# Patient Record
Sex: FEMALE | Race: WHITE | NOT HISPANIC OR LATINO
[De-identification: names, ages, dates, MRNs, and addresses within clinical notes are randomized per-mention and may not be internally consistent; named-entity substitution may affect disease eponyms.]

---

## 2018-02-06 PROBLEM — Z00.00 ENCOUNTER FOR PREVENTIVE HEALTH EXAMINATION: Status: ACTIVE | Noted: 2018-02-06

## 2018-02-21 ENCOUNTER — RECORD ABSTRACTING (OUTPATIENT)
Age: 68
End: 2018-02-21

## 2018-02-21 DIAGNOSIS — Z81.8 FAMILY HISTORY OF OTHER MENTAL AND BEHAVIORAL DISORDERS: ICD-10-CM

## 2018-02-21 DIAGNOSIS — Z83.79 FAMILY HISTORY OF OTHER DISEASES OF THE DIGESTIVE SYSTEM: ICD-10-CM

## 2018-02-21 DIAGNOSIS — Z78.0 ASYMPTOMATIC MENOPAUSAL STATE: ICD-10-CM

## 2018-02-21 DIAGNOSIS — Z92.89 PERSONAL HISTORY OF OTHER MEDICAL TREATMENT: ICD-10-CM

## 2018-02-21 RX ORDER — HYDROCHLOROTHIAZIDE 25 MG/1
25 TABLET ORAL
Refills: 0 | Status: ACTIVE | COMMUNITY

## 2018-02-21 RX ORDER — ZOLPIDEM TARTRATE 10 MG/1
10 TABLET ORAL
Refills: 0 | Status: ACTIVE | COMMUNITY

## 2018-02-27 ENCOUNTER — LABORATORY RESULT (OUTPATIENT)
Age: 68
End: 2018-02-27

## 2018-02-28 ENCOUNTER — APPOINTMENT (OUTPATIENT)
Dept: OBGYN | Facility: CLINIC | Age: 68
End: 2018-02-28
Payer: COMMERCIAL

## 2018-02-28 VITALS — HEIGHT: 64 IN | BODY MASS INDEX: 23.56 KG/M2 | WEIGHT: 138 LBS

## 2018-02-28 LAB
BILIRUB UR QL STRIP: NORMAL
GLUCOSE UR-MCNC: NORMAL
HCG UR QL: NORMAL EU/DL
HGB UR QL STRIP.AUTO: NORMAL
KETONES UR-MCNC: NORMAL
LEUKOCYTE ESTERASE UR QL STRIP: 500
NITRITE UR QL STRIP: NORMAL
PH UR STRIP: 5
PROT UR STRIP-MCNC: NORMAL
SP GR UR STRIP: 1.01

## 2018-02-28 PROCEDURE — 99397 PER PM REEVAL EST PAT 65+ YR: CPT

## 2018-02-28 PROCEDURE — 90686 IIV4 VACC NO PRSV 0.5 ML IM: CPT

## 2018-02-28 PROCEDURE — 81003 URINALYSIS AUTO W/O SCOPE: CPT | Mod: QW

## 2018-02-28 PROCEDURE — 90471 IMMUNIZATION ADMIN: CPT | Mod: 59

## 2018-03-14 ENCOUNTER — TRANSCRIPTION ENCOUNTER (OUTPATIENT)
Age: 68
End: 2018-03-14

## 2018-03-15 ENCOUNTER — APPOINTMENT (OUTPATIENT)
Dept: OBGYN | Facility: CLINIC | Age: 68
End: 2018-03-15
Payer: COMMERCIAL

## 2018-03-15 ENCOUNTER — RX RENEWAL (OUTPATIENT)
Age: 68
End: 2018-03-15

## 2018-03-15 DIAGNOSIS — Z87.09 PERSONAL HISTORY OF OTHER DISEASES OF THE RESPIRATORY SYSTEM: ICD-10-CM

## 2018-03-15 PROCEDURE — 76830 TRANSVAGINAL US NON-OB: CPT

## 2018-03-15 PROCEDURE — 99213 OFFICE O/P EST LOW 20 MIN: CPT

## 2018-11-27 ENCOUNTER — APPOINTMENT (OUTPATIENT)
Dept: OBGYN | Facility: CLINIC | Age: 68
End: 2018-11-27
Payer: COMMERCIAL

## 2018-11-27 ENCOUNTER — OUTPATIENT (OUTPATIENT)
Dept: OUTPATIENT SERVICES | Facility: HOSPITAL | Age: 68
LOS: 1 days | Discharge: HOME | End: 2018-11-27

## 2018-11-27 VITALS — BODY MASS INDEX: 23.05 KG/M2 | WEIGHT: 135 LBS | HEIGHT: 64 IN

## 2018-11-27 LAB
BILIRUB UR QL STRIP: NORMAL
GLUCOSE UR-MCNC: 100
HCG UR QL: NORMAL EU/DL
HGB UR QL STRIP.AUTO: NORMAL
KETONES UR-MCNC: 15
LEUKOCYTE ESTERASE UR QL STRIP: 500
NITRITE UR QL STRIP: NORMAL
PH UR STRIP: 5
PROT UR STRIP-MCNC: 500
SP GR UR STRIP: 1.02

## 2018-11-27 PROCEDURE — 81003 URINALYSIS AUTO W/O SCOPE: CPT | Mod: QW

## 2018-11-27 PROCEDURE — 96372 THER/PROPH/DIAG INJ SC/IM: CPT

## 2018-11-27 PROCEDURE — 99214 OFFICE O/P EST MOD 30 MIN: CPT | Mod: 25

## 2018-11-29 DIAGNOSIS — Z00.00 ENCOUNTER FOR GENERAL ADULT MEDICAL EXAMINATION WITHOUT ABNORMAL FINDINGS: ICD-10-CM

## 2018-12-01 LAB — HPV HIGH+LOW RISK DNA PNL CVX: NOT DETECTED

## 2019-03-07 ENCOUNTER — APPOINTMENT (OUTPATIENT)
Dept: OBGYN | Facility: CLINIC | Age: 69
End: 2019-03-07
Payer: COMMERCIAL

## 2019-03-07 PROCEDURE — 77085 DXA BONE DENSITY AXL VRT FX: CPT

## 2019-03-14 ENCOUNTER — RX RENEWAL (OUTPATIENT)
Age: 69
End: 2019-03-14

## 2019-03-14 RX ORDER — DENOSUMAB 60 MG/ML
60 INJECTION SUBCUTANEOUS
Qty: 1 | Refills: 1 | Status: ACTIVE | COMMUNITY
Start: 2019-03-14 | End: 1900-01-01

## 2019-06-25 ENCOUNTER — APPOINTMENT (OUTPATIENT)
Dept: OBGYN | Facility: CLINIC | Age: 69
End: 2019-06-25
Payer: COMMERCIAL

## 2019-06-25 VITALS — WEIGHT: 135 LBS | BODY MASS INDEX: 23.17 KG/M2

## 2019-06-25 PROCEDURE — 96372 THER/PROPH/DIAG INJ SC/IM: CPT

## 2019-12-19 ENCOUNTER — OUTPATIENT (OUTPATIENT)
Dept: OUTPATIENT SERVICES | Facility: HOSPITAL | Age: 69
LOS: 1 days | Discharge: HOME | End: 2019-12-19

## 2019-12-19 ENCOUNTER — APPOINTMENT (OUTPATIENT)
Dept: OBGYN | Facility: CLINIC | Age: 69
End: 2019-12-19
Payer: COMMERCIAL

## 2019-12-19 VITALS — HEIGHT: 64 IN | WEIGHT: 125 LBS | BODY MASS INDEX: 21.34 KG/M2

## 2019-12-19 DIAGNOSIS — M81.0 AGE-RELATED OSTEOPOROSIS W/OUT CURRENT PATHOLOGICAL FRACTURE: ICD-10-CM

## 2019-12-19 PROCEDURE — 96372 THER/PROPH/DIAG INJ SC/IM: CPT

## 2019-12-19 PROCEDURE — 99397 PER PM REEVAL EST PAT 65+ YR: CPT

## 2019-12-19 PROCEDURE — 81003 URINALYSIS AUTO W/O SCOPE: CPT | Mod: NC,QW

## 2019-12-20 DIAGNOSIS — Z01.411 ENCOUNTER FOR GYNECOLOGICAL EXAMINATION (GENERAL) (ROUTINE) WITH ABNORMAL FINDINGS: ICD-10-CM

## 2019-12-21 LAB
BILIRUB UR QL STRIP: NORMAL
CLARITY UR: CLEAR
GLUCOSE UR-MCNC: NORMAL
HCG UR QL: NORMAL EU/DL
HGB UR QL STRIP.AUTO: NORMAL
KETONES UR-MCNC: NORMAL
LEUKOCYTE ESTERASE UR QL STRIP: 75
NITRITE UR QL STRIP: NORMAL
PH UR STRIP: 5
PROT UR STRIP-MCNC: NORMAL
SP GR UR STRIP: 1.02

## 2020-12-16 PROBLEM — Z87.09 HISTORY OF ACUTE PHARYNGITIS: Status: RESOLVED | Noted: 2018-03-15 | Resolved: 2020-12-16

## 2021-11-18 ENCOUNTER — APPOINTMENT (OUTPATIENT)
Dept: OBGYN | Facility: CLINIC | Age: 71
End: 2021-11-18
Payer: COMMERCIAL

## 2021-11-18 ENCOUNTER — NON-APPOINTMENT (OUTPATIENT)
Age: 71
End: 2021-11-18

## 2021-11-18 VITALS — WEIGHT: 125 LBS | HEIGHT: 63 IN | TEMPERATURE: 98 F | BODY MASS INDEX: 22.15 KG/M2

## 2021-11-18 PROCEDURE — 99397 PER PM REEVAL EST PAT 65+ YR: CPT

## 2021-11-18 PROCEDURE — 81003 URINALYSIS AUTO W/O SCOPE: CPT | Mod: QW

## 2021-11-19 LAB
BILIRUB UR QL STRIP: NORMAL
GLUCOSE UR-MCNC: NORMAL
HCG UR QL: 0.2 EU/DL
HGB UR QL STRIP.AUTO: NORMAL
KETONES UR-MCNC: NORMAL
LEUKOCYTE ESTERASE UR QL STRIP: NORMAL
NITRITE UR QL STRIP: NORMAL
PH UR STRIP: 5.5
PROT UR STRIP-MCNC: NORMAL
SP GR UR STRIP: >-1.03

## 2021-12-09 LAB — CYTOLOGY CVX/VAG DOC THIN PREP: ABNORMAL

## 2022-07-21 ENCOUNTER — APPOINTMENT (OUTPATIENT)
Dept: CARDIOLOGY | Facility: CLINIC | Age: 72
End: 2022-07-21

## 2022-08-02 ENCOUNTER — APPOINTMENT (OUTPATIENT)
Dept: CARDIOLOGY | Facility: CLINIC | Age: 72
End: 2022-08-02

## 2022-08-02 VITALS
BODY MASS INDEX: 22.15 KG/M2 | TEMPERATURE: 98.7 F | WEIGHT: 125 LBS | DIASTOLIC BLOOD PRESSURE: 60 MMHG | HEART RATE: 76 BPM | SYSTOLIC BLOOD PRESSURE: 110 MMHG | HEIGHT: 63 IN

## 2022-08-02 DIAGNOSIS — R06.02 SHORTNESS OF BREATH: ICD-10-CM

## 2022-08-02 PROCEDURE — 93000 ELECTROCARDIOGRAM COMPLETE: CPT

## 2022-08-02 PROCEDURE — 99204 OFFICE O/P NEW MOD 45 MIN: CPT | Mod: 25

## 2022-08-02 RX ORDER — AMOXICILLIN 500 MG/1
500 CAPSULE ORAL 3 TIMES DAILY
Qty: 30 | Refills: 0 | Status: DISCONTINUED | COMMUNITY
Start: 2018-03-15 | End: 2022-08-02

## 2022-08-02 RX ORDER — CARBIDOPA AND LEVODOPA 25; 100 MG/1; MG/1
25-100 TABLET ORAL
Qty: 90 | Refills: 0 | Status: ACTIVE | COMMUNITY
Start: 2022-07-05

## 2022-08-02 RX ORDER — FLUDROCORTISONE ACETATE 0.1 MG/1
0.1 TABLET ORAL
Qty: 30 | Refills: 0 | Status: ACTIVE | COMMUNITY
Start: 2022-07-05

## 2022-08-02 RX ORDER — RASAGILINE MESYLATE 1 MG/1
1 TABLET ORAL DAILY
Refills: 0 | Status: ACTIVE | COMMUNITY

## 2022-08-02 RX ORDER — PRIMIDONE 50 MG/1
50 TABLET ORAL
Qty: 60 | Refills: 0 | Status: ACTIVE | COMMUNITY
Start: 2022-07-05

## 2022-08-02 RX ORDER — AMANTADINE HYDROCHLORIDE 100 MG/1
100 CAPSULE ORAL
Refills: 0 | Status: DISCONTINUED | COMMUNITY
End: 2022-08-02

## 2022-08-02 RX ORDER — PROPRANOLOL HYDROCHLORIDE 40 MG/1
40 TABLET ORAL
Qty: 30 | Refills: 0 | Status: ACTIVE | COMMUNITY
Start: 2022-07-05

## 2022-08-02 NOTE — REVIEW OF SYSTEMS
[Dyspnea on exertion] : dyspnea during exertion [Chest Discomfort] : chest discomfort [Lower Ext Edema] : lower extremity edema

## 2022-08-02 NOTE — DISCUSSION/SUMMARY
[FreeTextEntry1] : IV adenosine thallium stress test to exclude myocardial ischemia \par Maintain present cardiac medications.\par Patient was instructed to target her T. Cholesterol to less than 200 mg/dl and LDL cholesterol to less than 100 mg/dl.\par Maintain cardiac medications. \par Patient was advised on her BMP, CBC , fasting lipid profile and hepatic panel.\par Patient was also advised on the findings of her TTE and EKG's \par RV in 2 weeks after her adenosine thallium stress test.\par

## 2022-08-02 NOTE — ASSESSMENT
[FreeTextEntry1] : PVC's\par Mild to moderate TR\par Mild PI\par Exertional dyspnea\par Atypical chest pain\par Parkinson's disease\par Atherosclerosis of thoracic aorta on CXR with segmental calcifications of thoracic aorta reported\par R/O ASHD

## 2022-08-02 NOTE — PHYSICAL EXAM
[Well Developed] : well developed [Well Nourished] : well nourished [No Acute Distress] : no acute distress [Normal Conjunctiva] : normal conjunctiva [No Carotid Bruit] : no carotid bruit [Normal S1, S2] : normal S1, S2 [No Rub] : no rub [No Gallop] : no gallop [Murmur] : murmur [Soft] : abdomen soft [Non Tender] : non-tender [Venous varicosities] : venous varicosities [No Rash] : no rash [No Skin Lesions] : no skin lesions [Moves all extremities] : moves all extremities [Normal Speech] : normal speech [Alert and Oriented] : alert and oriented [Normal memory] : normal memory [de-identified] : Mild distension of Jugular veins c/w TR [de-identified] : Grade I/VI systolic murmur at RSB [de-identified] : Patient has Parkinson's disease and  [de-identified] : trace LE edema bilaterallt [de-identified] : Tremor

## 2022-08-02 NOTE — HISTORY OF PRESENT ILLNESS
[FreeTextEntry1] : Parkinson's disease\par TR mild to moderate\par Mild PI\par RICH\par Hypertension\par Tremor since she was in her 30's\par Patient denies a history of MI, angina, CHF, arrhythmia, TIA, CA, syncope, Hyperlipidemia, obesity or familial history of heart disease.\par PSurgHx: Tubal ligation 40 years ago

## 2022-08-02 NOTE — REASON FOR VISIT
[FreeTextEntry1] : Pleasant 71 year old WF presents for a second opinion Cardiology consultation due to abnormal findings on her TTE and having ectopy on her 12 lead EKG. She has also noted shortness of breath with exertion and was diagnosed with mild to moderate TR and Right atrial enlargement. She had not received an explanation of her TTE findings to her satisfaction and is here to determine the cause of her exertional dyspnea.

## 2022-08-22 ENCOUNTER — LABORATORY RESULT (OUTPATIENT)
Age: 72
End: 2022-08-22

## 2022-08-25 ENCOUNTER — OUTPATIENT (OUTPATIENT)
Dept: OUTPATIENT SERVICES | Facility: HOSPITAL | Age: 72
LOS: 1 days | Discharge: HOME | End: 2022-08-25

## 2022-08-25 ENCOUNTER — RESULT REVIEW (OUTPATIENT)
Age: 72
End: 2022-08-25

## 2022-08-25 DIAGNOSIS — R07.9 CHEST PAIN, UNSPECIFIED: ICD-10-CM

## 2022-08-25 PROCEDURE — 93018 CV STRESS TEST I&R ONLY: CPT

## 2022-08-25 PROCEDURE — 78452 HT MUSCLE IMAGE SPECT MULT: CPT | Mod: 26,ME

## 2022-08-25 PROCEDURE — G1004: CPT

## 2022-08-25 PROCEDURE — 93016 CV STRESS TEST SUPVJ ONLY: CPT

## 2022-08-25 RX ORDER — ADENOSINE 3 MG/ML
60 INJECTION INTRAVENOUS ONCE
Refills: 0 | Status: DISCONTINUED | OUTPATIENT
Start: 2022-08-25 | End: 2022-09-08

## 2022-09-15 ENCOUNTER — APPOINTMENT (OUTPATIENT)
Dept: CARDIOLOGY | Facility: CLINIC | Age: 72
End: 2022-09-15

## 2022-09-15 VITALS
DIASTOLIC BLOOD PRESSURE: 78 MMHG | TEMPERATURE: 98.7 F | WEIGHT: 125 LBS | SYSTOLIC BLOOD PRESSURE: 130 MMHG | BODY MASS INDEX: 22.15 KG/M2 | HEIGHT: 63 IN

## 2022-09-15 PROCEDURE — 99214 OFFICE O/P EST MOD 30 MIN: CPT

## 2022-09-15 NOTE — REASON FOR VISIT
[FreeTextEntry1] : Pleasant 71 year old WF presents for a second opinion Cardiology consultation due to abnormal findings on her TTE and having ectopy on her 12 lead EKG. She has also noted shortness of breath with exertion and was diagnosed with mild to moderate TR and Right atrial enlargement. She is here to review the results of her adenosine nuclear stress thallium.

## 2022-09-15 NOTE — PHYSICAL EXAM
[Well Developed] : well developed [Well Nourished] : well nourished [No Acute Distress] : no acute distress [Normal Conjunctiva] : normal conjunctiva [No Carotid Bruit] : no carotid bruit [Normal S1, S2] : normal S1, S2 [No Rub] : no rub [No Gallop] : no gallop [Murmur] : murmur [Soft] : abdomen soft [Non Tender] : non-tender [Venous varicosities] : venous varicosities [No Rash] : no rash [No Skin Lesions] : no skin lesions [Moves all extremities] : moves all extremities [Normal Speech] : normal speech [Alert and Oriented] : alert and oriented [Normal memory] : normal memory [de-identified] : Mild distension of Jugular veins c/w TR [de-identified] : Grade I/VI systolic murmur at RSB [de-identified] : Patient has Parkinson's disease and  [de-identified] : trace LE edema bilaterallt [de-identified] : Tremor

## 2022-09-15 NOTE — ASSESSMENT
[FreeTextEntry1] : PVC's\par Mild to moderate TR\par Mild PI\par Negative adenosine thallium stress test\par Parkinson's disease\par Atherosclerosis of thoracic aorta on CXR with segmental calcifications of thoracic aorta reported\par R/O ASHD

## 2022-09-15 NOTE — DISCUSSION/SUMMARY
[FreeTextEntry1] : IV adenosine thallium stress test to exclude myocardial ischemia revealed normal findings.\par Maintain present cardiac medications.\par Patient was instructed to target her T. Cholesterol to less than 200 mg/dl and LDL cholesterol to less than 100 mg/dl.\par Maintain cardiac medications. \par Patient was advised on her BMP, CBC , fasting lipid profile and hepatic panel.\par Patient was also advised on the findings of her TTE and EKG's \par RV in 1 year\par

## 2022-10-04 ENCOUNTER — EMERGENCY (EMERGENCY)
Facility: HOSPITAL | Age: 72
LOS: 0 days | Discharge: HOME | End: 2022-10-04
Attending: EMERGENCY MEDICINE | Admitting: EMERGENCY MEDICINE
Payer: COMMERCIAL

## 2022-10-04 VITALS
RESPIRATION RATE: 17 BRPM | OXYGEN SATURATION: 99 % | DIASTOLIC BLOOD PRESSURE: 67 MMHG | SYSTOLIC BLOOD PRESSURE: 135 MMHG | TEMPERATURE: 100 F | HEART RATE: 87 BPM

## 2022-10-04 VITALS
HEART RATE: 123 BPM | DIASTOLIC BLOOD PRESSURE: 66 MMHG | RESPIRATION RATE: 17 BRPM | SYSTOLIC BLOOD PRESSURE: 166 MMHG | TEMPERATURE: 103 F | WEIGHT: 130.07 LBS | OXYGEN SATURATION: 99 %

## 2022-10-04 DIAGNOSIS — G20 PARKINSON'S DISEASE: ICD-10-CM

## 2022-10-04 DIAGNOSIS — R10.32 LEFT LOWER QUADRANT PAIN: ICD-10-CM

## 2022-10-04 DIAGNOSIS — R10.9 UNSPECIFIED ABDOMINAL PAIN: ICD-10-CM

## 2022-10-04 DIAGNOSIS — R10.13 EPIGASTRIC PAIN: ICD-10-CM

## 2022-10-04 DIAGNOSIS — R52 PAIN, UNSPECIFIED: ICD-10-CM

## 2022-10-04 DIAGNOSIS — U07.1 COVID-19: ICD-10-CM

## 2022-10-04 DIAGNOSIS — R51.9 HEADACHE, UNSPECIFIED: ICD-10-CM

## 2022-10-04 LAB
ALBUMIN SERPL ELPH-MCNC: 4 G/DL — SIGNIFICANT CHANGE UP (ref 3.5–5.2)
ALP SERPL-CCNC: 124 U/L — HIGH (ref 30–115)
ALT FLD-CCNC: 15 U/L — SIGNIFICANT CHANGE UP (ref 0–41)
ANION GAP SERPL CALC-SCNC: 10 MMOL/L — SIGNIFICANT CHANGE UP (ref 7–14)
APPEARANCE UR: CLEAR — SIGNIFICANT CHANGE UP
APTT BLD: 31.8 SEC — SIGNIFICANT CHANGE UP (ref 27–39.2)
AST SERPL-CCNC: 34 U/L — SIGNIFICANT CHANGE UP (ref 0–41)
BACTERIA # UR AUTO: ABNORMAL
BASOPHILS # BLD AUTO: 0.02 K/UL — SIGNIFICANT CHANGE UP (ref 0–0.2)
BASOPHILS NFR BLD AUTO: 0.3 % — SIGNIFICANT CHANGE UP (ref 0–1)
BILIRUB SERPL-MCNC: 0.6 MG/DL — SIGNIFICANT CHANGE UP (ref 0.2–1.2)
BILIRUB UR-MCNC: NEGATIVE — SIGNIFICANT CHANGE UP
BUN SERPL-MCNC: 26 MG/DL — HIGH (ref 10–20)
CALCIUM SERPL-MCNC: 9.1 MG/DL — SIGNIFICANT CHANGE UP (ref 8.4–10.5)
CHLORIDE SERPL-SCNC: 100 MMOL/L — SIGNIFICANT CHANGE UP (ref 98–110)
CO2 SERPL-SCNC: 27 MMOL/L — SIGNIFICANT CHANGE UP (ref 17–32)
COLOR SPEC: YELLOW — SIGNIFICANT CHANGE UP
COMMENT - URINE: SIGNIFICANT CHANGE UP
CREAT SERPL-MCNC: 0.7 MG/DL — SIGNIFICANT CHANGE UP (ref 0.7–1.5)
DIFF PNL FLD: NEGATIVE — SIGNIFICANT CHANGE UP
EGFR: 91 ML/MIN/1.73M2 — SIGNIFICANT CHANGE UP
EOSINOPHIL # BLD AUTO: 0 K/UL — SIGNIFICANT CHANGE UP (ref 0–0.7)
EOSINOPHIL NFR BLD AUTO: 0 % — SIGNIFICANT CHANGE UP (ref 0–8)
EPI CELLS # UR: ABNORMAL /HPF
GLUCOSE SERPL-MCNC: 129 MG/DL — HIGH (ref 70–99)
GLUCOSE UR QL: NEGATIVE MG/DL — SIGNIFICANT CHANGE UP
HCT VFR BLD CALC: 37.2 % — SIGNIFICANT CHANGE UP (ref 37–47)
HGB BLD-MCNC: 12.6 G/DL — SIGNIFICANT CHANGE UP (ref 12–16)
IMM GRANULOCYTES NFR BLD AUTO: 0.4 % — HIGH (ref 0.1–0.3)
INR BLD: 1.38 RATIO — HIGH (ref 0.65–1.3)
KETONES UR-MCNC: ABNORMAL
LACTATE SERPL-SCNC: 1.4 MMOL/L — SIGNIFICANT CHANGE UP (ref 0.7–2)
LEUKOCYTE ESTERASE UR-ACNC: NEGATIVE — SIGNIFICANT CHANGE UP
LYMPHOCYTES # BLD AUTO: 0.42 K/UL — LOW (ref 1.2–3.4)
LYMPHOCYTES # BLD AUTO: 5.9 % — LOW (ref 20.5–51.1)
MCHC RBC-ENTMCNC: 29.3 PG — SIGNIFICANT CHANGE UP (ref 27–31)
MCHC RBC-ENTMCNC: 33.9 G/DL — SIGNIFICANT CHANGE UP (ref 32–37)
MCV RBC AUTO: 86.5 FL — SIGNIFICANT CHANGE UP (ref 81–99)
MONOCYTES # BLD AUTO: 0.23 K/UL — SIGNIFICANT CHANGE UP (ref 0.1–0.6)
MONOCYTES NFR BLD AUTO: 3.2 % — SIGNIFICANT CHANGE UP (ref 1.7–9.3)
NEUTROPHILS # BLD AUTO: 6.38 K/UL — SIGNIFICANT CHANGE UP (ref 1.4–6.5)
NEUTROPHILS NFR BLD AUTO: 90.2 % — HIGH (ref 42.2–75.2)
NITRITE UR-MCNC: NEGATIVE — SIGNIFICANT CHANGE UP
NRBC # BLD: 0 /100 WBCS — SIGNIFICANT CHANGE UP (ref 0–0)
PH UR: 6.5 — SIGNIFICANT CHANGE UP (ref 5–8)
PLATELET # BLD AUTO: 171 K/UL — SIGNIFICANT CHANGE UP (ref 130–400)
POTASSIUM SERPL-MCNC: 4.2 MMOL/L — SIGNIFICANT CHANGE UP (ref 3.5–5)
POTASSIUM SERPL-SCNC: 4.2 MMOL/L — SIGNIFICANT CHANGE UP (ref 3.5–5)
PROT SERPL-MCNC: 6.7 G/DL — SIGNIFICANT CHANGE UP (ref 6–8)
PROT UR-MCNC: 100 MG/DL
PROTHROM AB SERPL-ACNC: 15.9 SEC — HIGH (ref 9.95–12.87)
RBC # BLD: 4.3 M/UL — SIGNIFICANT CHANGE UP (ref 4.2–5.4)
RBC # FLD: 14.5 % — SIGNIFICANT CHANGE UP (ref 11.5–14.5)
RBC CASTS # UR COMP ASSIST: SIGNIFICANT CHANGE UP /HPF
SODIUM SERPL-SCNC: 137 MMOL/L — SIGNIFICANT CHANGE UP (ref 135–146)
SP GR SPEC: 1.02 — SIGNIFICANT CHANGE UP (ref 1.01–1.03)
TROPONIN T SERPL-MCNC: <0.01 NG/ML — SIGNIFICANT CHANGE UP
UROBILINOGEN FLD QL: 1 MG/DL
WBC # BLD: 7.08 K/UL — SIGNIFICANT CHANGE UP (ref 4.8–10.8)
WBC # FLD AUTO: 7.08 K/UL — SIGNIFICANT CHANGE UP (ref 4.8–10.8)
WBC UR QL: SIGNIFICANT CHANGE UP /HPF

## 2022-10-04 PROCEDURE — 71045 X-RAY EXAM CHEST 1 VIEW: CPT | Mod: 26

## 2022-10-04 PROCEDURE — 74177 CT ABD & PELVIS W/CONTRAST: CPT | Mod: 26,MA

## 2022-10-04 PROCEDURE — 99285 EMERGENCY DEPT VISIT HI MDM: CPT

## 2022-10-04 PROCEDURE — 93010 ELECTROCARDIOGRAM REPORT: CPT

## 2022-10-04 PROCEDURE — 70450 CT HEAD/BRAIN W/O DYE: CPT | Mod: 26,MA,59

## 2022-10-04 RX ORDER — METOCLOPRAMIDE HCL 10 MG
10 TABLET ORAL ONCE
Refills: 0 | Status: COMPLETED | OUTPATIENT
Start: 2022-10-04 | End: 2022-10-04

## 2022-10-04 RX ORDER — SODIUM CHLORIDE 9 MG/ML
1850 INJECTION, SOLUTION INTRAVENOUS ONCE
Refills: 0 | Status: COMPLETED | OUTPATIENT
Start: 2022-10-04 | End: 2022-10-04

## 2022-10-04 RX ORDER — ACETAMINOPHEN 500 MG
650 TABLET ORAL ONCE
Refills: 0 | Status: COMPLETED | OUTPATIENT
Start: 2022-10-04 | End: 2022-10-04

## 2022-10-04 RX ADMIN — Medication 650 MILLIGRAM(S): at 17:26

## 2022-10-04 RX ADMIN — Medication 104 MILLIGRAM(S): at 17:27

## 2022-10-04 RX ADMIN — SODIUM CHLORIDE 1850 MILLILITER(S): 9 INJECTION, SOLUTION INTRAVENOUS at 17:27

## 2022-10-04 NOTE — ED PROVIDER NOTE - OBJECTIVE STATEMENT
72y F pmh parkinson's presents for eval of headache. Pt has moderate aching frontal headache x2 days with associated body aches and abdominal pain. No aggravating or relieving factors. Pt was diagnosed with COVID x10 days ago.

## 2022-10-04 NOTE — ED PROVIDER NOTE - PATIENT PORTAL LINK FT
You can access the FollowMyHealth Patient Portal offered by [facility] by registering at the following website: [email or web address]. By joining [email or web address]’s FollowMyHealth portal, you will also be able to view your health information using other applications (apps) compatible with our system.

## 2022-10-04 NOTE — ED PROVIDER NOTE - NSFOLLOWUPINSTRUCTIONS_ED_ALL_ED_FT
Patient to be discharged from ED. Any available test results were discussed with patient and/or family. Verbal instructions given, including instructions to return to ED immediately for any new, worsening, or concerning symptoms. Patient endorsed understanding. Written discharge instructions additionally given, including follow-up plan.    Fever    A fever is an increase in the body's temperature above 100.4°F (38°C) or higher. In adults and children older than three months, a brief mild or moderate fever generally has no long-term effect, and it usually does not require treatment. Many times, fevers are the result of viral infections, which are self-resolving.  However, certain symptoms or diagnostic tests may suggest a bacterial infection that may respond to antibiotics. Take medications as directed by your health care provider.    SEEK IMMEDIATE MEDICAL CARE IF YOU OR YOUR CHILD HAVE ANY OF THE FOLLOWING SYMPTOMS : shortness of breath, seizure, rash/stiff neck/headache, severe abdominal pain, persistent vomiting, any signs of dehydration, or if your child has a fever for over five (5) days.

## 2022-10-04 NOTE — ED PROVIDER NOTE - NS ED ATTENDING STATEMENT MOD
This was a shared visit with the KENDY. I reviewed and verified the documentation and independently performed the documented:

## 2022-10-04 NOTE — ED PROVIDER NOTE - ATTENDING APP SHARED VISIT CONTRIBUTION OF CARE
72 y.o. F, PMH of Parkinson's dz, tested COVID (+) on home test 10 days ago, comes in c/o 3 day h/o HA, SOB, body aches, weakness. Getting worse. States HA resolved today but pt still feels weak. No leg pain or swelling. Pt vaccinated against COVID x3. On exam, pt in NAD, AAOx3, head NC/AT, CN II-XII intact, PEERL, EOMi, neck (-) midline tenderness, lungs CTA B/L, CV S1S2 regular, abdomen soft/(+) LLQ pain/ND/(+)BS, ext (-) edema, motor 5/5x4, sensation intact, parkinson's tremor noted. Will do labs, CT, XR, UA and reevaluate.

## 2022-10-04 NOTE — ED ADULT TRIAGE NOTE - CHIEF COMPLAINT QUOTE
had covid 2 weeks ago, today feels worse, rib pain, denies cough, headache, chills, bodyaches, does not own thermometer

## 2022-10-04 NOTE — ED PROVIDER NOTE - PHYSICAL EXAMINATION
CONST: Febrile, NAD  EYES: PERRL, EOMI, Sclera and conjunctiva clear.   ENT: No nasal discharge. Oropharynx normal appearing, no erythema or exudates. No abscess or swelling. Uvula midline.   NECK: Non-tender, no meningeal signs. normal ROM. supple   CARD: Tachycardiac S1 S2; No jvd  RESP: Equal BS B/L, No wheezes, rhonchi or rales. No distress  GI: Epigastric tenderness. Soft, non-distended. no cva tenderness. normal BS  MS: Normal ROM in all extremities. pulses 2 +. no calf tenderness or swelling  SKIN: Warm, dry, no acute rashes. Good turgor  NEURO: Parkinsonian tremor. A&Ox3, No focal deficits. Strength 5/5 with no sensory deficits. Finger to nose intact. Negative pronator drift

## 2022-10-04 NOTE — ED PROVIDER NOTE - CLINICAL SUMMARY MEDICAL DECISION MAKING FREE TEXT BOX
72 y.o. F, PMH of Parkinson's dz, tested COVID (+) on home test 10 days ago, comes in c/o 3 day h/o HA, SOB, body aches, weakness. Getting worse. States HA resolved today but pt still feels weak. No leg pain or swelling. Pt vaccinated against COVID x3. On exam, pt in NAD, AAOx3, head NC/AT, CN II-XII intact, PEERL, EOMi, neck (-) midline tenderness, lungs CTA B/L, CV S1S2 regular, abdomen soft/(+) LLQ pain/ND/(+)BS, ext (-) edema, motor 5/5x4, sensation intact, parkinson's tremor noted. Labs, CT, XR, UA done and reviewed. Will d/c. Return precautions given.

## 2022-10-04 NOTE — ED PROVIDER NOTE - NS ED ROS FT
Constitutional: (+) body aches, (-) fever  Eyes/ENT: (-) blurry vision, (-) epistaxis  Cardiovascular: (-) chest pain, (-) syncope  Respiratory: (-) cough, (-) shortness of breath  Gastrointestinal: (+) abd pain, (-) vomiting, (-) diarrhea  : (-) dysuria, (-) hematuria  Musculoskeletal: (-) neck pain, (-) back pain, (-) joint pain  Integumentary: (-) rash, (-) edema  Neurological: (+) headache, (-) altered mental status  Allergic/Immunologic: (-) pruritus

## 2022-10-06 LAB
CULTURE RESULTS: SIGNIFICANT CHANGE UP
SPECIMEN SOURCE: SIGNIFICANT CHANGE UP

## 2022-10-10 LAB
CULTURE RESULTS: SIGNIFICANT CHANGE UP
CULTURE RESULTS: SIGNIFICANT CHANGE UP
SPECIMEN SOURCE: SIGNIFICANT CHANGE UP
SPECIMEN SOURCE: SIGNIFICANT CHANGE UP

## 2022-11-15 ENCOUNTER — APPOINTMENT (OUTPATIENT)
Dept: CARDIOLOGY | Facility: CLINIC | Age: 72
End: 2022-11-15

## 2022-11-15 VITALS
HEIGHT: 63 IN | WEIGHT: 124 LBS | DIASTOLIC BLOOD PRESSURE: 82 MMHG | BODY MASS INDEX: 21.97 KG/M2 | HEART RATE: 67 BPM | SYSTOLIC BLOOD PRESSURE: 130 MMHG

## 2022-11-15 DIAGNOSIS — U07.1 COVID-19: ICD-10-CM

## 2022-11-15 PROBLEM — G20 PARKINSON'S DISEASE: Chronic | Status: ACTIVE | Noted: 2022-10-04

## 2022-11-15 PROCEDURE — 99214 OFFICE O/P EST MOD 30 MIN: CPT | Mod: 25

## 2022-11-15 PROCEDURE — 93000 ELECTROCARDIOGRAM COMPLETE: CPT

## 2022-11-15 NOTE — REASON FOR VISIT
[FreeTextEntry1] : Patient presents for follow up because she was diagnosed with COVID-19 on September 19,2022 and went to 2 separate Hospital ERs and an urgent care center and did not receive any therapy for her infection. She just wanted to have a repeat cardiac examination.

## 2022-11-15 NOTE — DISCUSSION/SUMMARY
[FreeTextEntry1] : IV adenosine thallium stress test revealed normal findings.\par Her prior TTE revealed Normal LV systolic function RICH,with PI and TR\par Maintain present cardiac medications.\par Patient was instructed to target her T. Cholesterol to less than 200 mg/dl and LDL cholesterol to less than 100 mg/dl.\par Maintain cardiac medications. \par Patient was advised on her BMP, CBC , fasting lipid profile and hepatic panel.\par Patient's EKG revealed NSR rate of 67 bpm and one PVC.\par She will repeat a TTE in 5/23 to follow her RICH and valvular insufficiency.\par RV in 5/23.\par

## 2022-11-15 NOTE — PHYSICAL EXAM
[Well Developed] : well developed [Well Nourished] : well nourished [No Acute Distress] : no acute distress [Normal Conjunctiva] : normal conjunctiva [No Carotid Bruit] : no carotid bruit [Normal S1, S2] : normal S1, S2 [No Rub] : no rub [No Gallop] : no gallop [Murmur] : murmur [Soft] : abdomen soft [Non Tender] : non-tender [Venous varicosities] : venous varicosities [No Rash] : no rash [No Skin Lesions] : no skin lesions [Moves all extremities] : moves all extremities [Normal Speech] : normal speech [Alert and Oriented] : alert and oriented [Normal memory] : normal memory [de-identified] : Mild distension of Jugular veins c/w TR [de-identified] : Grade I/VI systolic murmur at RSB [de-identified] : Patient has Parkinson's disease and  [de-identified] : trace LE edema bilaterallt [de-identified] : Tremor

## 2022-11-15 NOTE — ASSESSMENT
[FreeTextEntry1] : PVC's\par Mild to moderate TR\par Mild PI\par Negative adenosine thallium stress test\par Parkinson's disease\par Atherosclerosis of thoracic aorta on CXR with segmental calcifications of thoracic aorta reported\par

## 2022-11-15 NOTE — REVIEW OF SYSTEMS
[Dyspnea on exertion] : not dyspnea during exertion [Chest Discomfort] : no chest discomfort [Lower Ext Edema] : no extremity edema [Tremor] : a tremor was seen [Negative] : Heme/Lymph

## 2022-11-21 ENCOUNTER — APPOINTMENT (OUTPATIENT)
Dept: OBGYN | Facility: CLINIC | Age: 72
End: 2022-11-21
Payer: COMMERCIAL

## 2022-11-21 VITALS — TEMPERATURE: 98 F | WEIGHT: 124 LBS | BODY MASS INDEX: 21.97 KG/M2 | HEIGHT: 63 IN

## 2022-11-21 PROCEDURE — 99397 PER PM REEVAL EST PAT 65+ YR: CPT

## 2022-11-29 LAB — CYTOLOGY CVX/VAG DOC THIN PREP: ABNORMAL

## 2023-06-06 ENCOUNTER — APPOINTMENT (OUTPATIENT)
Dept: CARDIOLOGY | Facility: CLINIC | Age: 73
End: 2023-06-06
Payer: COMMERCIAL

## 2023-06-06 PROCEDURE — 93306 TTE W/DOPPLER COMPLETE: CPT

## 2023-06-20 ENCOUNTER — APPOINTMENT (OUTPATIENT)
Dept: CARDIOLOGY | Facility: CLINIC | Age: 73
End: 2023-06-20
Payer: COMMERCIAL

## 2023-06-20 VITALS
HEIGHT: 63 IN | DIASTOLIC BLOOD PRESSURE: 60 MMHG | SYSTOLIC BLOOD PRESSURE: 102 MMHG | HEART RATE: 65 BPM | WEIGHT: 125 LBS | BODY MASS INDEX: 22.15 KG/M2

## 2023-06-20 DIAGNOSIS — Z86.79 PERSONAL HISTORY OF OTHER DISEASES OF THE CIRCULATORY SYSTEM: ICD-10-CM

## 2023-06-20 PROCEDURE — 99214 OFFICE O/P EST MOD 30 MIN: CPT | Mod: 25

## 2023-06-20 PROCEDURE — 93000 ELECTROCARDIOGRAM COMPLETE: CPT

## 2023-06-20 NOTE — ASSESSMENT
[FreeTextEntry1] : PVC's\par Mild to moderate TR\par Mild PI\par Diastolic dysfunction grade I\par Negative adenosine thallium stress test\par Parkinson's disease\par Atherosclerosis of thoracic aorta on CXR with segmental calcifications of thoracic aorta reported\par

## 2023-06-20 NOTE — REVIEW OF SYSTEMS
[Tremor] : a tremor was seen [Negative] : Heme/Lymph [Dyspnea on exertion] : not dyspnea during exertion [Chest Discomfort] : no chest discomfort [Lower Ext Edema] : no extremity edema

## 2023-06-20 NOTE — DISCUSSION/SUMMARY
[FreeTextEntry1] : IV adenosine thallium stress test revealed normal findings.\par Her prior TTE revealed Normal LV systolic function RICH,with PI and TR\par Maintain present cardiac medications.\par Patient was instructed to target her T. Cholesterol to less than 200 mg/dl and LDL cholesterol to less than 100 mg/dl.\par Maintain cardiac medications. \par Patient was advised on her BMP, CBC , fasting lipid profile and hepatic panel.\par Patient's EKG revealed NSR rate of 67 bpm and one PVC.\par  Repeat TTE results showed similar findings, with regard to the valvular insufficiency and normal LV systolic function. No RICH was noted and there was grade 1 diastolic dysfunction.\par A copy of the results were provided to her.\par RV in 6 months.\par

## 2023-06-20 NOTE — PHYSICAL EXAM
[Well Developed] : well developed [Well Nourished] : well nourished [No Acute Distress] : no acute distress [Normal Conjunctiva] : normal conjunctiva [No Carotid Bruit] : no carotid bruit [Normal S1, S2] : normal S1, S2 [No Rub] : no rub [No Gallop] : no gallop [Murmur] : murmur [Soft] : abdomen soft [Non Tender] : non-tender [Venous varicosities] : venous varicosities [No Rash] : no rash [No Skin Lesions] : no skin lesions [Moves all extremities] : moves all extremities [Normal Speech] : normal speech [Alert and Oriented] : alert and oriented [Normal memory] : normal memory [de-identified] : Mild distension of Jugular veins c/w TR [de-identified] : Grade I/VI systolic murmur at RSB [de-identified] : Patient has Parkinson's disease and  [de-identified] : trace LE edema bilaterallt [de-identified] : Tremor

## 2023-09-12 ENCOUNTER — APPOINTMENT (OUTPATIENT)
Dept: CARDIOLOGY | Facility: CLINIC | Age: 73
End: 2023-09-12

## 2023-10-19 ENCOUNTER — TRANSCRIPTION ENCOUNTER (OUTPATIENT)
Age: 73
End: 2023-10-19

## 2023-11-15 ENCOUNTER — APPOINTMENT (OUTPATIENT)
Dept: CARDIOLOGY | Facility: CLINIC | Age: 73
End: 2023-11-15
Payer: COMMERCIAL

## 2023-11-15 VITALS
WEIGHT: 128 LBS | BODY MASS INDEX: 22.68 KG/M2 | HEIGHT: 63 IN | HEART RATE: 72 BPM | SYSTOLIC BLOOD PRESSURE: 120 MMHG | DIASTOLIC BLOOD PRESSURE: 70 MMHG | OXYGEN SATURATION: 97 %

## 2023-11-15 DIAGNOSIS — I70.0 ATHEROSCLEROSIS OF AORTA: ICD-10-CM

## 2023-11-15 DIAGNOSIS — I49.3 VENTRICULAR PREMATURE DEPOLARIZATION: ICD-10-CM

## 2023-11-15 PROCEDURE — 99214 OFFICE O/P EST MOD 30 MIN: CPT | Mod: 25

## 2023-11-15 PROCEDURE — 93000 ELECTROCARDIOGRAM COMPLETE: CPT

## 2023-11-15 RX ORDER — AMLODIPINE BESYLATE 10 MG/1
10 TABLET ORAL DAILY
Refills: 0 | Status: DISCONTINUED | COMMUNITY
End: 2023-11-15

## 2023-12-16 ENCOUNTER — NON-APPOINTMENT (OUTPATIENT)
Age: 73
End: 2023-12-16

## 2024-01-05 ENCOUNTER — NON-APPOINTMENT (OUTPATIENT)
Age: 74
End: 2024-01-05

## 2024-02-06 ENCOUNTER — APPOINTMENT (OUTPATIENT)
Dept: OBGYN | Facility: CLINIC | Age: 74
End: 2024-02-06
Payer: COMMERCIAL

## 2024-02-06 VITALS — WEIGHT: 128 LBS | BODY MASS INDEX: 22.68 KG/M2 | HEIGHT: 63 IN

## 2024-02-06 DIAGNOSIS — N95.2 POSTMENOPAUSAL ATROPHIC VAGINITIS: ICD-10-CM

## 2024-02-06 DIAGNOSIS — Z01.411 ENCOUNTER FOR GYNECOLOGICAL EXAMINATION (GENERAL) (ROUTINE) WITH ABNORMAL FINDINGS: ICD-10-CM

## 2024-02-06 DIAGNOSIS — M85.80 OTHER SPECIFIED DISORDERS OF BONE DENSITY AND STRUCTURE, UNSPECIFIED SITE: ICD-10-CM

## 2024-02-06 PROCEDURE — 81003 URINALYSIS AUTO W/O SCOPE: CPT | Mod: QW

## 2024-02-06 PROCEDURE — 99397 PER PM REEVAL EST PAT 65+ YR: CPT

## 2024-02-06 NOTE — DISCUSSION/SUMMARY
[FreeTextEntry1] : This was a normal annual GYN exam in this 73-year-old female.  Breast examination was normal.  Pelvic exam revealed atrophic changes of the vulva and vagina but no identifiable lesions.  A Pap test was taken.  The patient will be due for mammography after December 5., 2024.  She will otherwise return to this office as necessary.

## 2024-02-06 NOTE — PHYSICAL EXAM
[Examination Of The Breasts] : a normal appearance [No Masses] : no breast masses were palpable [Normal] : normal [Uterine Adnexae] : normal [FreeTextEntry1] : Moderate to severe atrophic changes of the labia bilaterally and the clitoris. [FreeTextEntry2] : No lesions were noted in the labia or clitoris. [FreeTextEntry8] : Bimanual exam revealed no pelvic tenderness and no palpable abnormalities.

## 2024-02-06 NOTE — HISTORY OF PRESENT ILLNESS
[FreeTextEntry1] : This 73-year-old female is here for an annual GYN exam.  Her last Pap test was normal on November 21, 2022.  Mammography was normal on December 5, 2023.  Bone density was last done May 2023 revealing moderate osteopenia of the hip and spine.  She was on Prolia up until 2021.  Her rheumatologist has recommended that she stay off of the Prolia at the present time.  Family history includes a daughter at the age of 47 with breast cancer.  This patient is allergic to azithromycin and does not smoke.  She has no other GYN complaints.

## 2024-02-07 ENCOUNTER — NON-APPOINTMENT (OUTPATIENT)
Age: 74
End: 2024-02-07

## 2024-03-05 LAB
BILIRUB UR QL STRIP: NORMAL
CLARITY UR: CLEAR
COLLECTION METHOD: NORMAL
CYTOLOGY CVX/VAG DOC THIN PREP: ABNORMAL
GLUCOSE UR-MCNC: NORMAL
HCG UR QL: 0.2 EU/DL
HGB UR QL STRIP.AUTO: NORMAL
KETONES UR-MCNC: NORMAL
LEUKOCYTE ESTERASE UR QL STRIP: NORMAL
NITRITE UR QL STRIP: NORMAL
PH UR STRIP: 6
PROT UR STRIP-MCNC: 100
SP GR UR STRIP: 1.02

## 2024-06-18 ENCOUNTER — APPOINTMENT (OUTPATIENT)
Dept: CARDIOLOGY | Facility: CLINIC | Age: 74
End: 2024-06-18
Payer: COMMERCIAL

## 2024-06-18 VITALS
DIASTOLIC BLOOD PRESSURE: 62 MMHG | WEIGHT: 118 LBS | SYSTOLIC BLOOD PRESSURE: 120 MMHG | HEIGHT: 63 IN | HEART RATE: 66 BPM | BODY MASS INDEX: 20.91 KG/M2

## 2024-06-18 DIAGNOSIS — I51.89 OTHER ILL-DEFINED HEART DISEASES: ICD-10-CM

## 2024-06-18 DIAGNOSIS — I37.1 NONRHEUMATIC PULMONARY VALVE INSUFFICIENCY: ICD-10-CM

## 2024-06-18 DIAGNOSIS — I07.1 RHEUMATIC TRICUSPID INSUFFICIENCY: ICD-10-CM

## 2024-06-18 DIAGNOSIS — I51.7 CARDIOMEGALY: ICD-10-CM

## 2024-06-18 DIAGNOSIS — Z86.69 PERSONAL HISTORY OF OTHER DISEASES OF THE NERVOUS SYSTEM AND SENSE ORGANS: ICD-10-CM

## 2024-06-18 PROCEDURE — 99214 OFFICE O/P EST MOD 30 MIN: CPT | Mod: 25

## 2024-06-18 PROCEDURE — 93000 ELECTROCARDIOGRAM COMPLETE: CPT

## 2024-06-18 NOTE — PHYSICAL EXAM
[Well Developed] : well developed [Well Nourished] : well nourished [No Acute Distress] : no acute distress [Normal Conjunctiva] : normal conjunctiva [No Carotid Bruit] : no carotid bruit [Normal S1, S2] : normal S1, S2 [No Rub] : no rub [No Gallop] : no gallop [Murmur] : murmur [Soft] : abdomen soft [Non Tender] : non-tender [Venous varicosities] : venous varicosities [No Rash] : no rash [No Skin Lesions] : no skin lesions [Moves all extremities] : moves all extremities [Normal Speech] : normal speech [Alert and Oriented] : alert and oriented [Normal memory] : normal memory [de-identified] : Mild distension of Jugular veins c/w TR [de-identified] : Grade I/VI systolic murmur at RSB [de-identified] : Patient has Parkinson's disease and tremor [de-identified] : trace LE edema bilaterallt [de-identified] : Tremor

## 2024-06-18 NOTE — REASON FOR VISIT
[FreeTextEntry1] : Patient presents for follow up Cardiology visit. She denies any cardiac complaints. She is undergoing speech classes to help with her phonation due to her Parkinson's disease. She did not go for any lab testing.

## 2024-06-18 NOTE — ASSESSMENT
[FreeTextEntry1] : PVC's not seen on today's EKG  Mild to moderate TR  Mild PI  Diastolic dysfunction grade I  Negative adenosine thallium stress test  Parkinson's disease  Atherosclerosis of thoracic aorta on CXR with segmental calcifications of thoracic aorta reported

## 2024-06-18 NOTE — DISCUSSION/SUMMARY
[FreeTextEntry1] : IV adenosine thallium stress test revealed normal findings. Her TTE revealed Normal LV systolic function RICH,with PI and TR Carotid duplex doppler showed no significant stenosis. Holter monitor revealed PVC's Maintain present cardiac medications. She feels fine on the propranolol and does not wish to switch to a different B-blocker due to being treated for a tremor with this medication. She was placed on fludrocortisone. EKG: NSR rate of 66 bpm no ectopy Patient was instructed to target her T. Cholesterol to less than 200 mg/dl and LDL cholesterol to less than 100 mg/dl. Maintain cardiac medications.  Patient was advised to have a BMP, CBC , fasting lipid profile and hepatic panel. RV in 1 year.  [EKG obtained to assist in diagnosis and management of assessed problem(s)] : EKG obtained to assist in diagnosis and management of assessed problem(s)